# Patient Record
Sex: MALE | Race: WHITE
[De-identification: names, ages, dates, MRNs, and addresses within clinical notes are randomized per-mention and may not be internally consistent; named-entity substitution may affect disease eponyms.]

---

## 2021-06-25 ENCOUNTER — HOSPITAL ENCOUNTER (OUTPATIENT)
Dept: HOSPITAL 84 - D.OPS | Age: 56
Discharge: HOME | End: 2021-06-25
Attending: INTERNAL MEDICINE
Payer: COMMERCIAL

## 2021-06-25 VITALS — WEIGHT: 264.55 LBS | HEIGHT: 71 IN | BODY MASS INDEX: 37.04 KG/M2 | BODY MASS INDEX: 37.04 KG/M2

## 2021-06-25 VITALS — DIASTOLIC BLOOD PRESSURE: 84 MMHG | SYSTOLIC BLOOD PRESSURE: 133 MMHG

## 2021-06-25 DIAGNOSIS — Z86.010: ICD-10-CM

## 2021-06-25 DIAGNOSIS — R12: ICD-10-CM

## 2021-06-25 DIAGNOSIS — K92.1: Primary | ICD-10-CM

## 2021-06-25 DIAGNOSIS — K64.8: ICD-10-CM

## 2021-06-25 LAB
ANION GAP SERPL CALC-SCNC: 14.8 MMOL/L (ref 8–16)
BASOPHILS NFR BLD AUTO: 0.5 % (ref 0–2)
BUN SERPL-MCNC: 19 MG/DL (ref 7–18)
CALCIUM SERPL-MCNC: 9.1 MG/DL (ref 8.5–10.1)
CHLORIDE SERPL-SCNC: 103 MMOL/L (ref 98–107)
CO2 SERPL-SCNC: 25.9 MMOL/L (ref 21–32)
CREAT SERPL-MCNC: 1 MG/DL (ref 0.6–1.3)
EOSINOPHIL NFR BLD: 1.9 % (ref 0–7)
ERYTHROCYTE [DISTWIDTH] IN BLOOD BY AUTOMATED COUNT: 13.7 % (ref 11.5–14.5)
GLUCOSE SERPL-MCNC: 110 MG/DL (ref 74–106)
HCT VFR BLD CALC: 44.3 % (ref 42–54)
HGB BLD-MCNC: 14.9 G/DL (ref 13.5–17.5)
LYMPHOCYTES # BLD: 2.6 10X3/UL (ref 1.32–3.57)
LYMPHOCYTES NFR BLD AUTO: 36.7 % (ref 15–50)
MCH RBC QN AUTO: 27.9 PG (ref 26–34)
MCHC RBC AUTO-ENTMCNC: 33.6 G/DL (ref 31–37)
MCV RBC: 83 FL (ref 80–100)
MONOCYTES NFR BLD: 6.6 % (ref 2–11)
NEUTROPHILS # BLD: 3.9 10X3/UL (ref 1.78–5.38)
NEUTROPHILS NFR BLD AUTO: 54.3 % (ref 40–80)
OSMOLALITY SERPL CALC.SUM OF ELEC: 281 MOSM/KG (ref 275–300)
PLATELET # BLD: 240 10X3/UL (ref 130–400)
PMV BLD AUTO: 6.9 FL (ref 7.4–10.4)
POTASSIUM SERPL-SCNC: 3.7 MMOL/L (ref 3.5–5.1)
RBC # BLD AUTO: 5.34 10X6/UL (ref 4.2–6.1)
SODIUM SERPL-SCNC: 140 MMOL/L (ref 136–145)
WBC # BLD AUTO: 7.1 10X3/UL (ref 4.8–10.8)

## 2021-06-25 NOTE — NUR
RECEIVED FROM PROCEDURE, AWAKE AT 0925
0940 PASSING FLATUS.  REQUESTS TO GET UP AND GET DRESSED.  IV REMOVED W CATH
INTACT
1000  DISCHARGED VIA  W ADULT  AFTER REVIEW OF DISCHARGE INSTRUCTIONS

## 2021-06-26 NOTE — OP
PATIENT NAME:  JUJU LEPE                           MEDICAL RECORD: [id number]
:67                                             LOCATION:D.OPS          
                                                         ADMISSION DATE:        
SURGEON:  ZAINA HERNANDEZ MD              
 
 
DATE OF OPERATION:  2021
 
PROCEDURE:  Colonoscopy.
 
PREOPERATIVE DIAGNOSES:  Rectal bleeding, history of colon polyps.
 
MEDICATION:  Propofol 300 mg.
 
DESCRIPTION OF PROCEDURE:  The patient was made comfortable in the left lateral
position.  The colonoscope was inserted through the rectum and advanced to the
cecum, identified by the ileocecal valve and appendiceal orifice.  The quality
of the prep was good.  The entire examined mucosa was normal.  There were small
nonbleeding internal hemorrhoids viewed on retroflexion.  The remainder of the
exam was normal.  The patient tolerated the procedure well.
 
FINAL DIAGNOSIS:  Nonbleeding internal hemorrhoids.  Remainder of exam normal.
 
PLAN:  Advance diet.  Repeat colonoscopy in 3-5 years given previous history of
polyps.  Fiber supplements.
 
TRANSINT:[id number] Voice Confirmation ID: [id number] DOCUMENT ID: [id number]
                                           
                                           ZAINA HERNANDEZ MD              
 
 
 
Electronically Signed by ZAINA HERNANDEZ on 21 at 0909
 
 
 
 
 
 
 
 
 
 
 
 
 
 
 
 
 
CC:                                                             [id number]
DICTATION DATE: 21     :     21 0948      [facility] 
                                                                      21
[facility]                                          
[address] [place] [address]